# Patient Record
Sex: MALE | Race: WHITE | NOT HISPANIC OR LATINO | Employment: OTHER | URBAN - METROPOLITAN AREA
[De-identification: names, ages, dates, MRNs, and addresses within clinical notes are randomized per-mention and may not be internally consistent; named-entity substitution may affect disease eponyms.]

---

## 2024-10-31 ENCOUNTER — HOSPITAL ENCOUNTER (OUTPATIENT)
Dept: CT IMAGING | Facility: HOSPITAL | Age: 64
Discharge: HOME | End: 2024-10-31
Attending: OTOLARYNGOLOGY
Payer: MEDICARE

## 2024-10-31 DIAGNOSIS — K13.21 LEUKOPLAKIA OF ORAL CAVITY: ICD-10-CM

## 2024-10-31 PROCEDURE — 70491 CT SOFT TISSUE NECK W/DYE: CPT

## 2024-10-31 RX ADMIN — IOHEXOL 85 ML: 350 INJECTION, SOLUTION INTRAVENOUS at 16:08

## 2025-03-13 ENCOUNTER — OFFICE VISIT (OUTPATIENT)
Dept: LAB | Facility: HOSPITAL | Age: 65
End: 2025-03-13
Payer: MEDICARE

## 2025-03-13 DIAGNOSIS — D00.06: ICD-10-CM

## 2025-03-13 LAB
ATRIAL RATE: 69 BPM
P AXIS: 26 DEGREES
PR INTERVAL: 170 MS
QRS AXIS: 46 DEGREES
QRSD INTERVAL: 88 MS
QT INTERVAL: 400 MS
QTC INTERVAL: 429 MS
T WAVE AXIS: 72 DEGREES
VENTRICULAR RATE: 69 BPM

## 2025-03-13 PROCEDURE — 93005 ELECTROCARDIOGRAM TRACING: CPT

## 2025-03-13 PROCEDURE — 93010 ELECTROCARDIOGRAM REPORT: CPT | Performed by: STUDENT IN AN ORGANIZED HEALTH CARE EDUCATION/TRAINING PROGRAM

## 2025-03-24 RX ORDER — DIPHENOXYLATE HYDROCHLORIDE AND ATROPINE SULFATE 2.5; .025 MG/1; MG/1
1 TABLET ORAL DAILY
COMMUNITY

## 2025-03-24 NOTE — PRE-PROCEDURE INSTRUCTIONS
Pre-Surgery Instructions:   Medication Instructions    Cholecalciferol 50 MCG (2000 UT) CAPS Stop taking 7 days prior to surgery.    losartan (COZAAR) 100 MG tablet Take night before surgery    multivitamin (THERAGRAN) TABS Stop taking 7 days prior to surgery.    triamterene-hydrochlorothiazide (MAXZIDE) 75-50 MG per tablet Hold day of surgery.   Medication instructions for day of surgery reviewed. Please take all instructed medications with only a sip of water.       You will receive a call one business day prior to surgery with an arrival time and hospital directions. If your surgery is scheduled on a Monday, the hospital will be calling you on the Friday prior to your surgery. If you have not heard from anyone by 8pm, please call the hospital supervisor through the hospital  at [phone]. ([place] [phone] or [place] [phone]).    Do not eat or drink anything after midnight the night before your surgery, including candy, mints, lifesavers, or chewing gum. Do not drink alcohol 24hrs before your surgery. Try not to smoke at least 24hrs before your surgery.       Follow the pre surgery showering instructions as listed in the “My Surgical Experience Booklet” or otherwise provided by your surgeon's office. Do not use a blade to shave the surgical area 1 week before surgery. It is okay to use a clean electric clippers up to 24 hours before surgery. Do not apply any lotions, creams, including makeup, cologne, deodorant, or perfumes after showering on the day of your surgery. Do not use dry shampoo, hair spray, hair gel, or any type of hair products.     No contact lenses, eye make-up, or artificial eyelashes. Remove nail polish, including gel polish, and any artificial, gel, or acrylic nails if possible. Remove all jewelry including rings and body piercing jewelry.     Wear causal clothing that is easy to take on and off. Consider your type of surgery.    Keep any valuables, jewelry, piercings at  home. Please bring any specially ordered equipment (sling, braces) if indicated.    Arrange for a responsible person to drive you to and from the hospital on the day of your surgery. Please confirm the visitor policy for the day of your procedure when you receive your phone call with an arrival time.     Call the surgeon's office with any new illnesses, exposures, or additional questions prior to surgery.    Please reference your “My Surgical Experience Booklet” for additional information to prepare for your upcoming surgery.

## 2025-04-01 ENCOUNTER — ANESTHESIA EVENT (OUTPATIENT)
Dept: PERIOP | Facility: HOSPITAL | Age: 65
End: 2025-04-01
Payer: MEDICARE

## 2025-04-02 ENCOUNTER — HOSPITAL ENCOUNTER (OUTPATIENT)
Facility: HOSPITAL | Age: 65
Setting detail: OUTPATIENT SURGERY
Discharge: HOME | End: 2025-04-03
Attending: OTOLARYNGOLOGY | Admitting: OTOLARYNGOLOGY
Payer: MEDICARE

## 2025-04-02 ENCOUNTER — ANESTHESIA (OUTPATIENT)
Dept: PERIOP | Facility: HOSPITAL | Age: 65
End: 2025-04-02
Payer: MEDICARE

## 2025-04-02 DIAGNOSIS — D00.06: ICD-10-CM

## 2025-04-02 DIAGNOSIS — K13.21 ORAL LEUKOPLAKIA: Primary | ICD-10-CM

## 2025-04-02 PROCEDURE — 88311 DECALCIFY TISSUE: CPT | Performed by: STUDENT IN AN ORGANIZED HEALTH CARE EDUCATION/TRAINING PROGRAM

## 2025-04-02 PROCEDURE — 88300 SURGICAL PATH GROSS: CPT | Performed by: STUDENT IN AN ORGANIZED HEALTH CARE EDUCATION/TRAINING PROGRAM

## 2025-04-02 PROCEDURE — 88307 TISSUE EXAM BY PATHOLOGIST: CPT | Performed by: STUDENT IN AN ORGANIZED HEALTH CARE EDUCATION/TRAINING PROGRAM

## 2025-04-02 PROCEDURE — 88331 PATH CONSLTJ SURG 1 BLK 1SPC: CPT | Performed by: STUDENT IN AN ORGANIZED HEALTH CARE EDUCATION/TRAINING PROGRAM

## 2025-04-02 PROCEDURE — 88342 IMHCHEM/IMCYTCHM 1ST ANTB: CPT | Performed by: STUDENT IN AN ORGANIZED HEALTH CARE EDUCATION/TRAINING PROGRAM

## 2025-04-02 PROCEDURE — 88305 TISSUE EXAM BY PATHOLOGIST: CPT | Performed by: STUDENT IN AN ORGANIZED HEALTH CARE EDUCATION/TRAINING PROGRAM

## 2025-04-02 PROCEDURE — 88341 IMHCHEM/IMCYTCHM EA ADD ANTB: CPT | Performed by: STUDENT IN AN ORGANIZED HEALTH CARE EDUCATION/TRAINING PROGRAM

## 2025-04-02 RX ORDER — EPHEDRINE SULFATE 50 MG/ML
INJECTION INTRAVENOUS AS NEEDED
Status: DISCONTINUED | OUTPATIENT
Start: 2025-04-02 | End: 2025-04-02

## 2025-04-02 RX ORDER — MAGNESIUM HYDROXIDE 1200 MG/15ML
LIQUID ORAL AS NEEDED
Status: DISCONTINUED | OUTPATIENT
Start: 2025-04-02 | End: 2025-04-02 | Stop reason: HOSPADM

## 2025-04-02 RX ORDER — DEXAMETHASONE SODIUM PHOSPHATE 10 MG/ML
INJECTION, SOLUTION INTRAMUSCULAR; INTRAVENOUS AS NEEDED
Status: DISCONTINUED | OUTPATIENT
Start: 2025-04-02 | End: 2025-04-02

## 2025-04-02 RX ORDER — HYDROMORPHONE HCL/PF 1 MG/ML
0.5 SYRINGE (ML) INJECTION
Status: DISCONTINUED | OUTPATIENT
Start: 2025-04-02 | End: 2025-04-02 | Stop reason: HOSPADM

## 2025-04-02 RX ORDER — ONDANSETRON 2 MG/ML
4 INJECTION INTRAMUSCULAR; INTRAVENOUS EVERY 6 HOURS PRN
Status: DISCONTINUED | OUTPATIENT
Start: 2025-04-02 | End: 2025-04-03 | Stop reason: HOSPADM

## 2025-04-02 RX ORDER — CEFAZOLIN SODIUM 1 G/3ML
INJECTION, POWDER, FOR SOLUTION INTRAMUSCULAR; INTRAVENOUS AS NEEDED
Status: DISCONTINUED | OUTPATIENT
Start: 2025-04-02 | End: 2025-04-02

## 2025-04-02 RX ORDER — ENOXAPARIN SODIUM 100 MG/ML
40 INJECTION SUBCUTANEOUS DAILY
Status: DISCONTINUED | OUTPATIENT
Start: 2025-04-03 | End: 2025-04-03 | Stop reason: HOSPADM

## 2025-04-02 RX ORDER — ONDANSETRON 2 MG/ML
INJECTION INTRAMUSCULAR; INTRAVENOUS AS NEEDED
Status: DISCONTINUED | OUTPATIENT
Start: 2025-04-02 | End: 2025-04-02

## 2025-04-02 RX ORDER — LOSARTAN POTASSIUM 50 MG/1
100 TABLET ORAL
Status: DISCONTINUED | OUTPATIENT
Start: 2025-04-02 | End: 2025-04-03 | Stop reason: HOSPADM

## 2025-04-02 RX ORDER — OXYCODONE HYDROCHLORIDE 5 MG/1
5 TABLET ORAL EVERY 4 HOURS PRN
Refills: 0 | Status: DISCONTINUED | OUTPATIENT
Start: 2025-04-02 | End: 2025-04-03 | Stop reason: HOSPADM

## 2025-04-02 RX ORDER — ACETAMINOPHEN 10 MG/ML
INJECTION, SOLUTION INTRAVENOUS AS NEEDED
Status: DISCONTINUED | OUTPATIENT
Start: 2025-04-02 | End: 2025-04-02

## 2025-04-02 RX ORDER — HYDROMORPHONE HCL/PF 1 MG/ML
0.5 SYRINGE (ML) INJECTION
Refills: 0 | Status: DISCONTINUED | OUTPATIENT
Start: 2025-04-02 | End: 2025-04-03 | Stop reason: HOSPADM

## 2025-04-02 RX ORDER — FENTANYL CITRATE/PF 50 MCG/ML
25 SYRINGE (ML) INJECTION
Status: DISCONTINUED | OUTPATIENT
Start: 2025-04-02 | End: 2025-04-02 | Stop reason: HOSPADM

## 2025-04-02 RX ORDER — MIDAZOLAM HYDROCHLORIDE 2 MG/2ML
INJECTION, SOLUTION INTRAMUSCULAR; INTRAVENOUS AS NEEDED
Status: DISCONTINUED | OUTPATIENT
Start: 2025-04-02 | End: 2025-04-02

## 2025-04-02 RX ORDER — FENTANYL CITRATE 50 UG/ML
INJECTION, SOLUTION INTRAMUSCULAR; INTRAVENOUS AS NEEDED
Status: DISCONTINUED | OUTPATIENT
Start: 2025-04-02 | End: 2025-04-02

## 2025-04-02 RX ORDER — PHENYLEPHRINE HCL IN 0.9% NACL 1 MG/10 ML
SYRINGE (ML) INTRAVENOUS AS NEEDED
Status: DISCONTINUED | OUTPATIENT
Start: 2025-04-02 | End: 2025-04-02

## 2025-04-02 RX ORDER — IBUPROFEN 600 MG/1
600 TABLET, FILM COATED ORAL EVERY 6 HOURS PRN
Status: DISCONTINUED | OUTPATIENT
Start: 2025-04-02 | End: 2025-04-02

## 2025-04-02 RX ORDER — ACETAMINOPHEN 325 MG/1
975 TABLET ORAL EVERY 6 HOURS PRN
Status: DISCONTINUED | OUTPATIENT
Start: 2025-04-02 | End: 2025-04-02

## 2025-04-02 RX ORDER — ALBUTEROL SULFATE 0.83 MG/ML
2.5 SOLUTION RESPIRATORY (INHALATION) ONCE AS NEEDED
Status: DISCONTINUED | OUTPATIENT
Start: 2025-04-02 | End: 2025-04-02 | Stop reason: HOSPADM

## 2025-04-02 RX ORDER — LIDOCAINE HYDROCHLORIDE 20 MG/ML
INJECTION, SOLUTION EPIDURAL; INFILTRATION; INTRACAUDAL; PERINEURAL AS NEEDED
Status: DISCONTINUED | OUTPATIENT
Start: 2025-04-02 | End: 2025-04-02

## 2025-04-02 RX ORDER — SODIUM CHLORIDE, SODIUM LACTATE, POTASSIUM CHLORIDE, CALCIUM CHLORIDE 600; 310; 30; 20 MG/100ML; MG/100ML; MG/100ML; MG/100ML
125 INJECTION, SOLUTION INTRAVENOUS CONTINUOUS
Status: DISCONTINUED | OUTPATIENT
Start: 2025-04-02 | End: 2025-04-02

## 2025-04-02 RX ORDER — IBUPROFEN 600 MG/1
600 TABLET, FILM COATED ORAL EVERY 6 HOURS PRN
Status: DISCONTINUED | OUTPATIENT
Start: 2025-04-02 | End: 2025-04-03 | Stop reason: HOSPADM

## 2025-04-02 RX ORDER — ROCURONIUM BROMIDE 10 MG/ML
INJECTION, SOLUTION INTRAVENOUS AS NEEDED
Status: DISCONTINUED | OUTPATIENT
Start: 2025-04-02 | End: 2025-04-02

## 2025-04-02 RX ORDER — TRIAMTERENE AND HYDROCHLOROTHIAZIDE 75; 50 MG/1; MG/1
1 TABLET ORAL EVERY MORNING
Status: DISCONTINUED | OUTPATIENT
Start: 2025-04-03 | End: 2025-04-03 | Stop reason: HOSPADM

## 2025-04-02 RX ORDER — LIDOCAINE HYDROCHLORIDE AND EPINEPHRINE 10; 10 MG/ML; UG/ML
INJECTION, SOLUTION INFILTRATION; PERINEURAL AS NEEDED
Status: DISCONTINUED | OUTPATIENT
Start: 2025-04-02 | End: 2025-04-02 | Stop reason: HOSPADM

## 2025-04-02 RX ORDER — PROPOFOL 10 MG/ML
INJECTION, EMULSION INTRAVENOUS AS NEEDED
Status: DISCONTINUED | OUTPATIENT
Start: 2025-04-02 | End: 2025-04-02

## 2025-04-02 RX ORDER — ONDANSETRON 2 MG/ML
4 INJECTION INTRAMUSCULAR; INTRAVENOUS ONCE AS NEEDED
Status: DISCONTINUED | OUTPATIENT
Start: 2025-04-02 | End: 2025-04-02 | Stop reason: HOSPADM

## 2025-04-02 RX ADMIN — PROPOFOL 160 MG: 10 INJECTION, EMULSION INTRAVENOUS at 16:38

## 2025-04-02 RX ADMIN — ROCURONIUM 10 MG: 50 INJECTION, SOLUTION INTRAVENOUS at 17:37

## 2025-04-02 RX ADMIN — DEXAMETHASONE SODIUM PHOSPHATE 10 MG: 10 INJECTION, SOLUTION INTRAMUSCULAR; INTRAVENOUS at 16:38

## 2025-04-02 RX ADMIN — FENTANYL CITRATE 50 MCG: 50 INJECTION INTRAMUSCULAR; INTRAVENOUS at 16:30

## 2025-04-02 RX ADMIN — CEFAZOLIN 2000 MG: 1 INJECTION, POWDER, FOR SOLUTION INTRAMUSCULAR; INTRAVENOUS at 16:51

## 2025-04-02 RX ADMIN — MIDAZOLAM 2 MG: 1 INJECTION INTRAMUSCULAR; INTRAVENOUS at 16:30

## 2025-04-02 RX ADMIN — SUGAMMADEX 200 MG: 100 INJECTION, SOLUTION INTRAVENOUS at 18:33

## 2025-04-02 RX ADMIN — PROPOFOL 60 MCG/KG/MIN: 10 INJECTION, EMULSION INTRAVENOUS at 16:44

## 2025-04-02 RX ADMIN — ROCURONIUM 60 MG: 50 INJECTION, SOLUTION INTRAVENOUS at 16:39

## 2025-04-02 RX ADMIN — SODIUM CHLORIDE, SODIUM LACTATE, POTASSIUM CHLORIDE, AND CALCIUM CHLORIDE 125 ML/HR: .6; .31; .03; .02 INJECTION, SOLUTION INTRAVENOUS at 12:52

## 2025-04-02 RX ADMIN — Medication 200 MCG: at 18:27

## 2025-04-02 RX ADMIN — ONDANSETRON 4 MG: 2 INJECTION INTRAMUSCULAR; INTRAVENOUS at 18:37

## 2025-04-02 RX ADMIN — ROCURONIUM 10 MG: 50 INJECTION, SOLUTION INTRAVENOUS at 17:57

## 2025-04-02 RX ADMIN — FENTANYL CITRATE 50 MCG: 50 INJECTION INTRAMUSCULAR; INTRAVENOUS at 17:09

## 2025-04-02 RX ADMIN — EPHEDRINE SULFATE 10 MG: 50 INJECTION INTRAVENOUS at 16:52

## 2025-04-02 RX ADMIN — EPHEDRINE SULFATE 10 MG: 50 INJECTION INTRAVENOUS at 17:47

## 2025-04-02 RX ADMIN — IBUPROFEN 600 MG: 600 TABLET, FILM COATED ORAL at 23:45

## 2025-04-02 RX ADMIN — ACETAMINOPHEN 1000 MG: 10 INJECTION INTRAVENOUS at 17:29

## 2025-04-02 RX ADMIN — LIDOCAINE HYDROCHLORIDE 100 MG: 20 INJECTION, SOLUTION EPIDURAL; INFILTRATION; INTRACAUDAL at 16:38

## 2025-04-02 NOTE — ANESTHESIA PREPROCEDURE EVALUATION
Procedure:  LEFT FLOOR OF MOUTH RESECTION, advancement closure (Left: Mouth)  DENTAL EXTRACTIONS (Mouth)    Relevant Problems   ANESTHESIA (within normal limits)      CARDIO   (+) High blood pressure      ENDO (within normal limits)      GI/HEPATIC   (+) Fatty liver      PULMONARY   (+) Sleep apnea        Physical Exam    Airway    Mallampati score: I  TM Distance: >3 FB  Neck ROM: full     Dental   No notable dental hx     Cardiovascular  Cardiovascular exam normal    Pulmonary  Pulmonary exam normal     Other Findings        Anesthesia Plan  ASA Score- 2     Anesthesia Type- general with ASA Monitors.         Additional Monitors:     Airway Plan:     Comment: Check with surgeon re nasal vs oral ETT.       Plan Factors-    Chart reviewed.    Patient summary reviewed.                  Induction- intravenous.    Postoperative Plan-         Informed Consent- Anesthetic plan and risks discussed with patient.        NPO Status:  No vitals data found for the desired time range.

## 2025-04-02 NOTE — ANESTHESIA POSTPROCEDURE EVALUATION
Post-Op Assessment Note    CV Status:  Stable  Pain Score: 1    Pain management: adequate       Mental Status:  Alert and awake   Hydration Status:  Euvolemic   PONV Controlled:  Controlled   Airway Patency:  Patent     Post Op Vitals Reviewed: Yes    No anethesia notable event occurred.    Staff: Anesthesiologist           Last Filed PACU Vitals:  Vitals Value Taken Time   Temp 97.5 °F (36.4 °C) 04/02/25 1850   Pulse 78 04/02/25 1906   /69 04/02/25 1904   Resp 20 04/02/25 1906   SpO2 94 % 04/02/25 1906   Vitals shown include unfiled device data.    Modified Grant:     Vitals Value Taken Time   Activity 2 04/02/25 1850   Respiration 2 04/02/25 1850   Circulation 2 04/02/25 1850   Consciousness 2 04/02/25 1850   Oxygen Saturation 1 04/02/25 1850     Modified Grant Score: 9

## 2025-04-02 NOTE — H&P
CC: No chief complaint on file.      REF: Bart Dior MD    History obtained from patient    HXPI:64 y.o. male noted with oral lesion 2019.  Seen by dr. Girard oral surgery [facility].  Did biopsy and then two ablations (2019 and then one before) to try to resolve the lesion.  Has been going back every 6 months, and it sometimes and goes.      Oral Exam: Normal lips, teeth, and gums, Normal tongue, floor of mouth, Normal buccal mucosa, retromolar trigone, and Normal hard palate and uvula    Leukoplakia along gingiva of buccal mucosa on lower posterior gingiva along mandible.  Tooth 21-17    More concerning area of leukoplakia along gingiva of lingual surface.  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  High grade dysplasia   For OR today

## 2025-04-02 NOTE — DISCHARGE INSTR - AVS FIRST PAGE
Liquid diet for 1-2 days  Advance to soft foods after that for one week. Then back to regular diet. Avoid chewing on left side if possible   Ibuprofen for pain  Follow up with Dr. Dior in 2 weeks   Oral rinses: rinse your mouth with 1 cup water, 1 tsp salt, and 1 tsp baking soda after every meal  Call [phone] with questions or concerns

## 2025-04-02 NOTE — INTERVAL H&P NOTE
H&P reviewed. After examining the patient I find no changes in the patients condition since the H&P had been written.    Vitals:    04/02/25 1227   BP: 153/75   Pulse: 64   Resp: 16   Temp: 97.7 °F (36.5 °C)   SpO2: 95%   For OR today    ROS otherwise unremarkable    Heart:clear  Lungs:clear  Abd: soft  Extremities wnl

## 2025-04-02 NOTE — ANESTHESIA POSTPROCEDURE EVALUATION
Post-Op Assessment Note    CV Status:  Stable  Pain Score: 0    Pain management: adequate       Mental Status:  Alert and awake   Hydration Status:  Euvolemic   PONV Controlled:  Controlled   Airway Patency:  Patent     Post Op Vitals Reviewed: Yes    No anethesia notable event occurred.    Staff: Anesthesiologist, CRNA           Last Filed PACU Vitals:  Vitals Value Taken Time   Temp 97.2    Pulse 80 04/02/25 1851   /70 04/02/25 1849   Resp 2 04/02/25 1851   SpO2 94 % 04/02/25 1851   Vitals shown include unfiled device data.

## 2025-04-03 VITALS
HEART RATE: 69 BPM | BODY MASS INDEX: 34.01 KG/M2 | WEIGHT: 251.1 LBS | TEMPERATURE: 97.9 F | RESPIRATION RATE: 18 BRPM | SYSTOLIC BLOOD PRESSURE: 112 MMHG | HEIGHT: 72 IN | OXYGEN SATURATION: 99 % | DIASTOLIC BLOOD PRESSURE: 68 MMHG

## 2025-04-03 LAB
ANION GAP SERPL CALCULATED.3IONS-SCNC: 10 MMOL/L (ref 4–13)
BUN SERPL-MCNC: 14 MG/DL (ref 5–25)
CALCIUM SERPL-MCNC: 9.6 MG/DL (ref 8.4–10.2)
CHLORIDE SERPL-SCNC: 106 MMOL/L (ref 96–108)
CO2 SERPL-SCNC: 24 MMOL/L (ref 21–32)
CREAT SERPL-MCNC: 0.8 MG/DL (ref 0.6–1.3)
GFR SERPL CREATININE-BSD FRML MDRD: 93 ML/MIN/1.73SQ M
GLUCOSE P FAST SERPL-MCNC: 115 MG/DL (ref 65–99)
GLUCOSE SERPL-MCNC: 115 MG/DL (ref 65–140)
POTASSIUM SERPL-SCNC: 4.1 MMOL/L (ref 3.5–5.3)
SODIUM SERPL-SCNC: 140 MMOL/L (ref 135–147)

## 2025-04-03 PROCEDURE — 80048 BASIC METABOLIC PNL TOTAL CA: CPT | Performed by: OTOLARYNGOLOGY

## 2025-04-03 RX ADMIN — IBUPROFEN 600 MG: 600 TABLET, FILM COATED ORAL at 05:09

## 2025-04-03 NOTE — RESTORATIVE TECHNICIAN NOTE
Restorative Technician Note      Patient Name: Crow Moore     Restorative Tech Visit Date: 04/03/25  Note Type: Mobility  Patient Position Upon Consult: Bedside chair  Activity Performed: Ambulated  Patient Position at End of Consult: All needs within reach; Bedside chair

## 2025-04-03 NOTE — PROGRESS NOTES
Otolaryngology [facility]PRS Progress Note:  Assessment/Plan: POD 1 s/p L FOM resection, dental extractions, and mucosal advancement flap - Doing well. Plan for d/c home today. Healing as expected post-operatively. Eating and drinking, pain controlled  1. No acute ENT interventions  2. Full liquid diet today, can advance to soft foods 4/4 or 4/5 depending on pain    Subjective: Pt POD 1 s/p L FOM resection, dental extractions, and mucosal advancement flap. Doing well. No acute events overnight.     Objective:   /91 (BP Location: Left arm)   Pulse 89   Temp 97.8 °F (36.6 °C) (Axillary)   Resp 20   Ht 6' (1.829 m)   Wt 114 kg (251 lb 1.7 oz)   SpO2 95%   BMI 34.06 kg/m²     Physical Exam   Gen: NAD  HEENT: intra-oral sutures intact  Lungs: Breathing easily. No stertor or stridor  CV: Good distal perfusion  Neck: Soft and flat        Dispo: home today

## 2025-04-03 NOTE — PLAN OF CARE
Problem: PAIN - ADULT  Goal: Verbalizes/displays adequate comfort level or baseline comfort level  Description: Interventions:- Encourage patient to monitor pain and request assistance- Assess pain using appropriate pain scale- Administer analgesics based on type and severity of pain and evaluate response- Implement non-pharmacological measures as appropriate and evaluate response- Consider cultural and social influences on pain and pain management- Notify physician/advanced practitioner if interventions unsuccessful or patient reports new pain  Outcome: Progressing     Problem: INFECTION - ADULT  Goal: Absence or prevention of progression during hospitalization  Description: INTERVENTIONS:- Assess and monitor for signs and symptoms of infection- Monitor lab/diagnostic results- Monitor all insertion sites, i.e. indwelling lines, tubes, and drains- Monitor endotracheal if appropriate and nasal secretions for changes in amount and color- [place] appropriate cooling/warming therapies per order- Administer medications as ordered- Instruct and encourage patient and family to use good hand hygiene technique- Identify and instruct in appropriate isolation precautions for identified infection/condition  Outcome: Progressing     Problem: DISCHARGE PLANNING  Goal: Discharge to home or other facility with appropriate resources  Description: INTERVENTIONS:- Identify barriers to discharge w/patient and caregiver- Arrange for needed discharge resources and transportation as appropriate- Identify discharge learning needs (meds, wound care, etc.)- Arrange for interpretive services to assist at discharge as needed- Refer to Case Management Department for coordinating discharge planning if the patient needs post-hospital services based on physician/advanced practitioner order or complex needs related to functional status, cognitive ability, or social support system  Outcome: Progressing     Problem: Knowledge Deficit  Goal:  Patient/family/caregiver demonstrates understanding of disease process, treatment plan, medications, and discharge instructions  Description: Complete learning assessment and assess knowledge base.Interventions:- Provide teaching at level of understanding- Provide teaching via preferred learning methods  Outcome: Progressing

## 2025-04-09 NOTE — OP NOTE
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]    Procedure(s):    Dental extraction of teeth 18,19,20  Left - LEFT FLOOR OF MOUTH RESECTION and gingiva  Bony resection of mandible   Buccal cheek flap advancement closure 6cm by 4cm      Specimen(s):  ID Type Source Tests Collected by Time Destination   1 : #18,#19,#20 Tooth Tooth TISSUE EXAM Bart Dior MD 2025 1706    2 : Left Lingual Posterior Gingiva Resection stitch marks posterior Tissue Oral Mucosa/Gingiva TISSUE EXAM Bart Dior MD 2025 1726    3 : Left Posterior Buccal Gingiva blue = anterior Tissue Oral Mucosa/Gingiva TISSUE EXAM Bart Dior MD 2025 1751    4 : Left Gingiva Resection: Left Buccal Margin/Lateral Margin Tissue Oral Mucosa/Gingiva TISSUE EXAM Bart Dior MD 2025 1739    5 : Left Gingiva Resection: Floor Mouth Margin Tissue Oral Mucosa/Gingiva TISSUE EXAM Brat Dior MD 2025 1742    6 : Left Gingiva Resection: Floor Mouth Margin, Anterior Tissue Oral Mucosa/Gingiva TISSUE EXAM Bart Dior MD 2025 1742    7 : Bone Fragment Tissue Bone TISSUE EXAM Bart Dior MD 2025 1756        Estimated Blood Loss:   Minimal    Drains:  * No LDAs found *    Anesthesia Type:   General    Operative Indications:  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  He was taken today for definitive management.    Operative Findings:  Leukoplakia was located on the buccal surface as well as the carcinoma in situ along the lingual  surface of the gingiva.  3 teeth were extracted, teeth numbers 18 19 and 20.  The bone was resected and frozen section margins were sent circumferentially which were negative.  An advancement cheek flap rotation flap was performed to closure.      Complications:   None    Procedure and Technique:     The patient was identified in the holding and brought to the operating room, anesthesia induced by Anesthesia team.  The patient was positioned and prepped and draped in usual fashion.  A time-out was performed per the usual hospital protocol and the procedure was begun as follows.    The tumor and irregular tissue was once again located along the lingual aspect of the left gingiva.  To facilitate removal teeth numbers 18 19th and 20 were removed.  These teeth were removed in their entirety.  Following removal a rongeur was used to remove the bone and a bur was used to bur the bone smoothed.    Soft tissue was then resected from the buccal mucosa with there was leukoplakia present.  The entire gingival lesion was then removed in its entirety with sharp and Bovie electrocautery.  The size of this lesion was about 4.2cm x 1.5cm in size    This extended onto the floor of mouth.  The mass was completely removed and sent for permanent section.  The wound is copiously irrigated and inspected for hemostasis.    Frozen section margins were sent circumferentially from around the area which were all negative for high-grade dysplasia.    A 6 x 4 cm skin mucosal advancement flap was designed.  This was based off the buccal mucosa.  Back cuts were made and the tissue was advanced over the extracted teeth to be closed against the m  floor of mouth.  2-0 Vicryl's were used to secure the flap.  Gelfoam was placed into the dental socket defect.    The patient was turned back over anesthesia for further care       I was present for the entire procedure.    Patient Disposition:  PACU              SIGNATURE: Bart Dior MD  DATE: April 9,  2025  TIME: 3:34 PM

## 2025-04-25 PROCEDURE — 88305 TISSUE EXAM BY PATHOLOGIST: CPT | Performed by: STUDENT IN AN ORGANIZED HEALTH CARE EDUCATION/TRAINING PROGRAM

## 2025-04-25 PROCEDURE — 88300 SURGICAL PATH GROSS: CPT | Performed by: STUDENT IN AN ORGANIZED HEALTH CARE EDUCATION/TRAINING PROGRAM

## 2025-04-25 PROCEDURE — 88307 TISSUE EXAM BY PATHOLOGIST: CPT | Performed by: STUDENT IN AN ORGANIZED HEALTH CARE EDUCATION/TRAINING PROGRAM

## 2025-04-25 PROCEDURE — 88341 IMHCHEM/IMCYTCHM EA ADD ANTB: CPT | Performed by: STUDENT IN AN ORGANIZED HEALTH CARE EDUCATION/TRAINING PROGRAM

## 2025-04-25 PROCEDURE — 88342 IMHCHEM/IMCYTCHM 1ST ANTB: CPT | Performed by: STUDENT IN AN ORGANIZED HEALTH CARE EDUCATION/TRAINING PROGRAM

## 2025-04-25 PROCEDURE — 88311 DECALCIFY TISSUE: CPT | Performed by: STUDENT IN AN ORGANIZED HEALTH CARE EDUCATION/TRAINING PROGRAM

## 2025-04-30 ENCOUNTER — EVALUATION (OUTPATIENT)
Dept: SPEECH THERAPY | Facility: REHABILITATION | Age: 65
End: 2025-04-30
Attending: OTOLARYNGOLOGY
Payer: MEDICARE

## 2025-04-30 DIAGNOSIS — D00.06: ICD-10-CM

## 2025-04-30 PROCEDURE — 92610 EVALUATE SWALLOWING FUNCTION: CPT | Performed by: SPEECH-LANGUAGE PATHOLOGIST

## 2025-04-30 PROCEDURE — 92526 ORAL FUNCTION THERAPY: CPT | Performed by: SPEECH-LANGUAGE PATHOLOGIST

## 2025-04-30 NOTE — PROGRESS NOTES
"Speech-Language Pathology Initial Evaluation    Today's date: 2025   Patient’s name: Crow Moore  : 1960  MRN: [id number]  Safety measures: No known  Referring provider: Bart Dior MD    Encounter Diagnosis     ICD-10-CM    1. 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 Riley Dior for speech evaluation secondary to left mandibular dental extraction, gingiva resection, buccal mucosa flap advancement.     He presents with no speech, swallowing or voice concerns today but is received as part of post-operative head and neck cancer care protocol. Given his recent surgery on 2025, he was provided with basic tongue range of motion exercises to maintain his current strength and range of motion. Given that no radiation is planned, regardless of final pathology, as part of his treatment plan, he is discharged with instructions and encouraged to return if needed.    Long-term goals:  -Patient will perform lingual range of motion (ROM) exercises on his own and return as needed.      Plan:  Patient would benefit from outpatient skilled Speech Therapy services: No services recommended at this time. Patient provided with home exercises to maintain on his own.      Subjective:  History of present illness: Patient is a 64 y.o. male who was referred to outpatient skilled Speech Therapy services for a dysphagia evaluation. He first sought care for a oral lesion in 2019 and underwent ablations in the past but the lesion persisted. Recent surgery of left mandibular dental extraction, gingiva resection, buccal mucosa flap advancement with Dr. Dior revealed no cancer, per patient, but they are awaiting final pathology. He was told even if the final pathology results reveal superficial cancer, \"they got it all\" and no radiation is planned. He notes he had some slight discomfort after surgery " "and was annoyed by his stitches but he presents today with no pain, dysphagia or speech concerns.    Patient's goal(s): \"Just want to follow-up because Dr. Dior told me to\"    Pain: Absent 0    Hearing: WFL  Vision: Glasses    Home environment/lifestyle: Live with your wife  Highest level of education:  Tech school  Vocational status: Retired, worked for  firm as     Objective:  Dysphagia Evaluation:    -Reason for referral:  Oral lesion resection 04/02/2025    -Subjective report of swallowing difficulty:  None    -Eating Assessment Tool (EAT-10) is a self-administered, symptom-specific outcome instrument for dysphagia. It consists of ten statements that a patient rates on a scale of 0-4, with 0=no problem to 4=severe problem. A score of 3 or more is abnormal. Patient obtained a score of 0/40. (see scanned document)    -Difficulty swallowing: None    -Current diet (solids): Regular  -Current diet (liquids): Thin  -Current pill intake method: Pills by mouth  -Alternative Feeding Method?: No    -Facial appearance Symmetrical   -Mandible function Adequate ROM   -Dentition Adequate   -Labial function WFL   -Lingual function WFL   -Velar function Symmetrical   -Oral apraxia? Absent   -Vocal quality Clear/adequate   -Volitional cough Strong/productive   -Respiration WFL   -Drooling? None at present, a lot after surgery but this has resolved   -Tremor/involuntary movement? Not present     LIQUID and SOLID CONSISTENCY TESTING:   Did not test today, no dysphagia complaints    -Factors affecting performance: None    -Safety concerns: No limitations    -Risk factors: None      SWALLOWING SAFETY PRECAUTIONS:  -Recommended solids: Regular    -Recommended liquids: Thin    -Recommended medication form: Pills    -Frequent/thorough oral care Completed salt water rinse for two weeks, rinses mouth with water.     -Aspiration precautions   *Monitor for signs/symptoms concerning for aspiration (e.g., low " "grade fever, increase in WBC, change in chest x-ray, increased congestion, increased coughing with P.O. intake)    -Strategies: Other Instructed him to continue taking small bites, cutting food thoroughly. He notes he can't \"quite chew steak\" on the left side yet but he is generally eating everything he can. He is considering dental implants in the future.    -Positioning: Upright position during meals    -Supervision: Independent     -Referrals: None       Treatment:  Provided patient with lingual ROM exercises which he demonstrated with 100% accuracy after initial instruction. He was instructed to continue 5-10 reps of each, 1-2 times daily for the next 3-4 weeks as tolerated. He was provided education about maintenance of exercises on his own and instructed to return as needed.     "

## 2025-04-30 NOTE — LETTER
2025    Bart Dior MD  [address] [facility] [facility] [address]  [address]  [facility] [place] [address]    Patient: Crow Moore   YOB: 1960   Date of Visit: 2025     Encounter Diagnosis     ICD-10-CM    1. 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 Bart Dior MD:    Thank you for your recent referral of Crow Moore. Please review the attached evaluation summary from Crow's recent visit.     Please verify that you agree with the plan of care by signing the attached order.     If you have any questions or concerns, please do not hesitate to call.     I sincerely appreciate the opportunity to share in the care of one of your patients and hope to have another opportunity to work with you in the near future.     Sincerely,    Federica Topete, SLP      Referring Provider:     Based upon review of the patient's progress and continued therapy plan, it is my medical opinion that Crow Moore should continue speech therapy treatment at the Physical [facility] at [facility]:    Speech-Language Pathology Initial Evaluation    Today's date: 2025   Patient’s name: Crow Moore  : 1960  MRN: [id number]  Safety measures: No known  Referring provider: Bart Dior MD    Encounter Diagnosis     ICD-10-CM    1. 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 Riley Dior for speech evaluation secondary to left mandibular dental extraction, gingiva resection, buccal mucosa flap advancement.     He presents with no speech, swallowing or voice concerns today but is received as part of post-operative head and neck cancer care protocol. Given his recent surgery on 2025, he was provided with basic tongue range of motion exercises to maintain his current strength and range of motion. Given that no radiation is  "planned, regardless of final pathology, as part of his treatment plan, he is discharged with instructions and encouraged to return if needed.    Long-term goals:  -Patient will perform lingual range of motion (ROM) exercises on his own and return as needed.      Plan:  Patient would benefit from outpatient skilled Speech Therapy services: No services recommended at this time. Patient provided with home exercises to maintain on his own.      Subjective:  History of present illness: Patient is a 64 y.o. male who was referred to outpatient skilled Speech Therapy services for a dysphagia evaluation. He first sought care for a oral lesion in 2019 and underwent ablations in the past but the lesion persisted. Recent surgery of left mandibular dental extraction, gingiva resection, buccal mucosa flap advancement with Dr. Dior revealed no cancer, per patient, but they are awaiting final pathology. He was told even if the final pathology results reveal superficial cancer, \"they got it all\" and no radiation is planned. He notes he had some slight discomfort after surgery and was annoyed by his stitches but he presents today with no pain, dysphagia or speech concerns.    Patient's goal(s): \"Just want to follow-up because Dr. Dior told me to\"    Pain: Absent 0    Hearing: WFL  Vision: Glasses    Home environment/lifestyle: Live with your wife  Highest level of education: Tech school  Vocational status: Retired, worked for  firm as     Objective:  Dysphagia Evaluation:    -Reason for referral: Oral lesion resection 04/02/2025    -Subjective report of swallowing difficulty: None    -Eating Assessment Tool (EAT-10) is a self-administered, symptom-specific outcome instrument for dysphagia. It consists of ten statements that a patient rates on a scale of 0-4, with 0=no problem to 4=severe problem. A score of 3 or more is abnormal. Patient obtained a score of 0/40. (see scanned document)    -Difficulty " "swallowing: None    -Current diet (solids): Regular  -Current diet (liquids): Thin  -Current pill intake method: Pills by mouth  -Alternative Feeding Method?: No    -Facial appearance Symmetrical   -Mandible function Adequate ROM   -Dentition Adequate   -Labial function WFL   -Lingual function WFL   -Velar function Symmetrical   -Oral apraxia? Absent   -Vocal quality Clear/adequate   -Volitional cough Strong/productive   -Respiration WFL   -Drooling? None at present, a lot after surgery but this has resolved   -Tremor/involuntary movement? Not present     LIQUID and SOLID CONSISTENCY TESTING:   Did not test today, no dysphagia complaints    -Factors affecting performance: None    -Safety concerns: No limitations    -Risk factors: None      SWALLOWING SAFETY PRECAUTIONS:  -Recommended solids: Regular    -Recommended liquids: Thin    -Recommended medication form: Pills    -Frequent/thorough oral care Completed salt water rinse for two weeks, rinses mouth with water.     -Aspiration precautions   *Monitor for signs/symptoms concerning for aspiration (e.g., low grade fever, increase in WBC, change in chest x-ray, increased congestion, increased coughing with P.O. intake)    -Strategies: Other Instructed him to continue taking small bites, cutting food thoroughly. He notes he can't \"quite chew steak\" on the left side yet but he is generally eating everything he can. He is considering dental implants in the future.    -Positioning: Upright position during meals    -Supervision: Independent     -Referrals: None       Treatment:  Provided patient with lingual ROM exercises which he demonstrated with 100% accuracy after initial instruction. He was instructed to continue 5-10 reps of each, 1-2 times daily for the next 3-4 weeks as tolerated. He was provided education about maintenance of exercises on his own and instructed to return as needed.                       Bart Dior MD  [address]  [address]  [place] " [id number]  Via In Basket        No notes on file

## (undated) DEVICE — SCD SEQUENTIAL COMPRESSION COMFORT SLEEVE MEDIUM KNEE LENGTH: Brand: KENDALL SCD

## (undated) DEVICE — PACK UNIVERSAL NECK

## (undated) DEVICE — PAD GROUNDING DUAL ADULT

## (undated) DEVICE — MANDIBLE PACK: Brand: CARDINAL HEALTH

## (undated) DEVICE — GLOVE SRG BIOGEL 7

## (undated) DEVICE — DECANTER: Brand: UNBRANDED

## (undated) DEVICE — Device

## (undated) DEVICE — SUT VICRYL 3-0 SH 27 IN J416H

## (undated) DEVICE — GLOVE SRG BIOGEL 7.5

## (undated) DEVICE — SLIM BODY SKIN STAPLER: Brand: APPOSE ULC

## (undated) DEVICE — SURGIFOAM 8.5 X 12.5

## (undated) DEVICE — TELFA NON-ADHERENT ABSORBENT DRESSING: Brand: TELFA

## (undated) DEVICE — CORD BIPOLAR 12FT REUSEABLE

## (undated) DEVICE — INTENDED FOR TISSUE SEPARATION, AND OTHER PROCEDURES THAT REQUIRE A SHARP SURGICAL BLADE TO PUNCTURE OR CUT.: Brand: BARD-PARKER SAFETY BLADES SIZE 15, STERILE

## (undated) DEVICE — SUCTION COAGULATOR: Brand: VALLEYLAB

## (undated) DEVICE — NEEDLE 25G X 1 1/2

## (undated) DEVICE — SPECIMEN CONTAINER STERILE PEEL PACK

## (undated) DEVICE — TIBURON SPLIT SHEET: Brand: CONVERTORS